# Patient Record
Sex: MALE | Race: WHITE | NOT HISPANIC OR LATINO
[De-identification: names, ages, dates, MRNs, and addresses within clinical notes are randomized per-mention and may not be internally consistent; named-entity substitution may affect disease eponyms.]

---
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
## 2022-05-24 ENCOUNTER — APPOINTMENT (OUTPATIENT)
Dept: PEDIATRICS | Facility: CLINIC | Age: 22
End: 2022-05-24
Payer: COMMERCIAL

## 2022-05-24 VITALS
HEIGHT: 70.28 IN | TEMPERATURE: 97.1 F | BODY MASS INDEX: 21.32 KG/M2 | HEART RATE: 98 BPM | WEIGHT: 150.6 LBS | OXYGEN SATURATION: 100 %

## 2022-05-24 DIAGNOSIS — Z76.89 PERSONS ENCOUNTERING HEALTH SERVICES IN OTHER SPECIFIED CIRCUMSTANCES: ICD-10-CM

## 2022-05-24 DIAGNOSIS — Z00.00 ENCOUNTER FOR GENERAL ADULT MEDICAL EXAMINATION W/OUT ABNORMAL FINDINGS: ICD-10-CM

## 2022-05-24 DIAGNOSIS — L55.1 SUNBURN OF SECOND DEGREE: ICD-10-CM

## 2022-05-24 DIAGNOSIS — R80.9 PROTEINURIA, UNSPECIFIED: ICD-10-CM

## 2022-05-24 PROCEDURE — 99213 OFFICE O/P EST LOW 20 MIN: CPT

## 2022-05-24 RX ORDER — SILVER SULFADIAZINE 10 MG/G
1 CREAM TOPICAL TWICE DAILY
Qty: 1 | Refills: 0 | Status: ACTIVE | COMMUNITY
Start: 2022-05-24 | End: 1900-01-01

## 2022-05-24 RX ORDER — DOXYCYCLINE 100 MG/1
100 CAPSULE ORAL
Qty: 60 | Refills: 0 | Status: DISCONTINUED | COMMUNITY
Start: 2021-02-17 | End: 2022-05-24

## 2022-05-24 RX ORDER — TRETINOIN 0.25 MG/G
0.03 CREAM TOPICAL
Qty: 45 | Refills: 0 | Status: DISCONTINUED | COMMUNITY
Start: 2021-02-17 | End: 2022-05-24

## 2022-05-24 NOTE — DISCUSSION/SUMMARY
[FreeTextEntry1] : -2ND DEGREE SUNBURN\par -APPLY ALOE GEL\par -WILL PRESCRIBE SILVADENE CREAM \par -FAILED VISION TEST AT LAST VISIT, SAW EYE DR COUPLE WEEKS AGO AND GOT NEW PRESCRIPTION \par -ORDERED UA AND GC LABS BECAUSE DID NOT COMPLETE LAST VISIT

## 2022-05-24 NOTE — HISTORY OF PRESENT ILLNESS
[Derm Symptoms] : DERM SYMPTOMS [de-identified] : SUNBURN [FreeTextEntry6] : -SUNBURN X 3 DAYS AGO\par -BLISTERS TO SHOULDER\par -GOLFING WITHOUT TSHIRT OR SUNSCREEN X 3 DAYS AGO\par -CANNOT MOVE

## 2022-05-24 NOTE — PHYSICAL EXAM
[Alert] : alert [Normocephalic] : normocephalic [EOMI] : EOMI [Regular Rate and Rhythm] : regular rate and rhythm [No Murmurs] : no murmurs [de-identified] : PECTUS EXCAVATUM  [de-identified] : VESICULAR RASH TO SHOULDERS, ERYTHEMA TO TRUNK, NO WEEPING LESIONS

## 2022-05-26 PROBLEM — Z00.00 ENCOUNTER FOR PREVENTIVE HEALTH EXAMINATION: Status: ACTIVE | Noted: 2020-07-09

## 2022-05-26 LAB
C TRACH RRNA SPEC QL NAA+PROBE: NOT DETECTED
N GONORRHOEA RRNA SPEC QL NAA+PROBE: NOT DETECTED
SOURCE AMPLIFICATION: NORMAL

## 2022-06-07 LAB
APPEARANCE: CLEAR
BACTERIA: NEGATIVE
BILIRUBIN URINE: NEGATIVE
BLOOD URINE: NEGATIVE
COLOR: YELLOW
CREAT SPEC-SCNC: 231 MG/DL
CREAT/PROT UR: 1.2 RATIO
GLUCOSE QUALITATIVE U: NEGATIVE
HYALINE CASTS: 3 /LPF
KETONES URINE: NEGATIVE
LEUKOCYTE ESTERASE URINE: NEGATIVE
MICROSCOPIC-UA: NORMAL
NITRITE URINE: NEGATIVE
PH URINE: 6.5
PROT UR-MCNC: 274 MG/DL
PROTEIN URINE: ABNORMAL
RED BLOOD CELLS URINE: 2 /HPF
SPECIFIC GRAVITY URINE: 1.03
SQUAMOUS EPITHELIAL CELLS: 1 /HPF
UROBILINOGEN URINE: NORMAL
WHITE BLOOD CELLS URINE: 1 /HPF